# Patient Record
Sex: FEMALE | Race: WHITE
[De-identification: names, ages, dates, MRNs, and addresses within clinical notes are randomized per-mention and may not be internally consistent; named-entity substitution may affect disease eponyms.]

---

## 2019-06-03 ENCOUNTER — HOSPITAL ENCOUNTER (EMERGENCY)
Dept: HOSPITAL 76 - ED | Age: 69
Discharge: HOME | End: 2019-06-03
Payer: MEDICARE

## 2019-06-03 VITALS — DIASTOLIC BLOOD PRESSURE: 77 MMHG | SYSTOLIC BLOOD PRESSURE: 105 MMHG

## 2019-06-03 DIAGNOSIS — N30.00: ICD-10-CM

## 2019-06-03 DIAGNOSIS — Z79.84: ICD-10-CM

## 2019-06-03 DIAGNOSIS — E11.9: ICD-10-CM

## 2019-06-03 DIAGNOSIS — I10: ICD-10-CM

## 2019-06-03 DIAGNOSIS — H66.001: Primary | ICD-10-CM

## 2019-06-03 LAB
ALBUMIN DIAFP-MCNC: 4.1 G/DL (ref 3.2–5.5)
ALBUMIN/GLOB SERPL: 1.2 {RATIO} (ref 1–2.2)
ALP SERPL-CCNC: 53 IU/L (ref 42–121)
ALT SERPL W P-5'-P-CCNC: 20 IU/L (ref 10–60)
ANION GAP SERPL CALCULATED.4IONS-SCNC: 12 MMOL/L (ref 6–13)
AST SERPL W P-5'-P-CCNC: 21 IU/L (ref 10–42)
BASOPHILS NFR BLD AUTO: 0 10^3/UL (ref 0–0.1)
BASOPHILS NFR BLD AUTO: 0.4 %
BILIRUB BLD-MCNC: 0.9 MG/DL (ref 0.2–1)
BUN SERPL-MCNC: 13 MG/DL (ref 6–20)
CALCIUM UR-MCNC: 9.2 MG/DL (ref 8.5–10.3)
CHLORIDE SERPL-SCNC: 102 MMOL/L (ref 101–111)
CLARITY UR REFRACT.AUTO: CLEAR
CO2 SERPL-SCNC: 24 MMOL/L (ref 21–32)
CREAT SERPLBLD-SCNC: 0.8 MG/DL (ref 0.4–1)
EOSINOPHIL # BLD AUTO: 0.1 10^3/UL (ref 0–0.7)
EOSINOPHIL NFR BLD AUTO: 1.3 %
ERYTHROCYTE [DISTWIDTH] IN BLOOD BY AUTOMATED COUNT: 13.8 % (ref 12–15)
GFRSERPLBLD MDRD-ARVRAT: 71 ML/MIN/{1.73_M2} (ref 89–?)
GLOBULIN SER-MCNC: 3.3 G/DL (ref 2.1–4.2)
GLUCOSE SERPL-MCNC: 128 MG/DL (ref 70–100)
GLUCOSE UR QL STRIP.AUTO: NEGATIVE MG/DL
HGB UR QL STRIP: 12.9 G/DL (ref 12–16)
KETONES UR QL STRIP.AUTO: NEGATIVE MG/DL
LIPASE SERPL-CCNC: 34 U/L (ref 22–51)
LYMPHOCYTES # SPEC AUTO: 1 10^3/UL (ref 1.5–3.5)
LYMPHOCYTES NFR BLD AUTO: 13.6 %
MCH RBC QN AUTO: 29.7 PG (ref 27–31)
MCHC RBC AUTO-ENTMCNC: 33.8 G/DL (ref 32–36)
MCV RBC AUTO: 87.9 FL (ref 81–99)
MONOCYTES # BLD AUTO: 0.6 10^3/UL (ref 0–1)
MONOCYTES NFR BLD AUTO: 8.1 %
NEUTROPHILS # BLD AUTO: 5.4 10^3/UL (ref 1.5–6.6)
NEUTROPHILS # SNV AUTO: 7.1 X10^3/UL (ref 4.8–10.8)
NEUTROPHILS NFR BLD AUTO: 76.6 %
NITRITE UR QL STRIP.AUTO: NEGATIVE
PDW BLD AUTO: 7.1 FL (ref 7.9–10.8)
PH UR STRIP.AUTO: 6.5 PH (ref 5–7.5)
PLATELET # BLD: 189 10^3/UL (ref 130–450)
PROT SPEC-MCNC: 7.4 G/DL (ref 6.7–8.2)
PROT UR STRIP.AUTO-MCNC: NEGATIVE MG/DL
RBC # UR STRIP.AUTO: NEGATIVE /UL
RBC MAR: 4.34 10^6/UL (ref 4.2–5.4)
SODIUM SERPLBLD-SCNC: 138 MMOL/L (ref 135–145)
SP GR UR STRIP.AUTO: <=1.005 (ref 1–1.03)
SQUAMOUS URNS QL MICRO: (no result)
UROBILINOGEN UR QL STRIP.AUTO: (no result) E.U./DL
UROBILINOGEN UR STRIP.AUTO-MCNC: NEGATIVE MG/DL

## 2019-06-03 PROCEDURE — 36415 COLL VENOUS BLD VENIPUNCTURE: CPT

## 2019-06-03 PROCEDURE — 85025 COMPLETE CBC W/AUTO DIFF WBC: CPT

## 2019-06-03 PROCEDURE — 83690 ASSAY OF LIPASE: CPT

## 2019-06-03 PROCEDURE — 87086 URINE CULTURE/COLONY COUNT: CPT

## 2019-06-03 PROCEDURE — 80053 COMPREHEN METABOLIC PANEL: CPT

## 2019-06-03 PROCEDURE — 81001 URINALYSIS AUTO W/SCOPE: CPT

## 2019-06-03 PROCEDURE — 96372 THER/PROPH/DIAG INJ SC/IM: CPT

## 2019-06-03 PROCEDURE — 99283 EMERGENCY DEPT VISIT LOW MDM: CPT

## 2019-06-03 PROCEDURE — 81003 URINALYSIS AUTO W/O SCOPE: CPT

## 2019-06-03 NOTE — ED PHYSICIAN DOCUMENTATION
History of Present Illness





- Stated complaint


Stated Complaint: FEVER/HA/BODY ACHES





- Chief complaint


Chief Complaint: Fever





- History obtained from


History obtained from: Patient, Family





- History of Present Illness


Timing: Today





- Additonal information


Additional information: 





Previously well 68-year-old female complains of a low-grade fever and muscle and

joint aches and pains beginning today.  She has had minimal cough and she does 

have a headache.  She felt that the headache may be a migraine when it began 

yesterday.  Today she has developed a fever and aches and pains.  She denies 

muffled hearing or ear pain she denies sore throat she denies urinary symptoms 

or difficulty breathing.





Review of Systems


Constitutional: reports: Fever, Chills, Myalgias, Fatigue, Sweats


Eyes: denies: Decreased vision


Ears: denies: Ear pain


Nose: denies: Rhinorrhea / runny nose, Congestion


Throat: denies: Sore throat


Cardiac: denies: Chest pain / pressure, Palpitations


Respiratory: reports: Cough.  denies: Dyspnea


GI: denies: Abdominal Pain, Nausea, Vomiting


: denies: Dysuria, Frequency


Skin: denies: Rash


Musculoskeletal: denies: Neck pain, Back pain, Extremity pain


Neurologic: reports: Headache.  denies: Generalized weakness, Focal weakness, N

umbness, Head injury, LOC





PD PAST MEDICAL HISTORY





- Past Medical History


Past Medical History: Yes


Cardiovascular: Hypertension, High cholesterol, Other


Respiratory: Pneumonia


Neuro: Headaches, Migraines


Endocrine/Autoimmune: Type 2 diabetes, HyPOthyroidism


GI: GERD, Hemorrhoids


GYN: None


: None


HEENT: None


Psych: Anxiety


Musculoskeletal: Osteoarthritis, Chronic back pain


Other Past Medical History: ventricular arrhythmia, diverticuli





- Past Surgical History


Past Surgical History: Yes


General: Colonoscopy


/GYN: Hysterectomy, Oophrectomy


HEENT: Cataracts, Tonsil/Adenoidectomy





- Present Medications


Home Medications: 


                                Ambulatory Orders











 Medication  Instructions  Recorded  Confirmed


 


Atenolol 25 mg PO 01/07/16 01/07/16


 


Atorvastatin [Lipitor] 1 mg 01/07/16 01/07/16


 


Gabapentin 100 mg PO BID 01/07/16 01/07/16


 


Lisinopril 5 mg PO 01/07/16 01/07/16


 


Omeprazole 10 mg PO 01/07/16 01/07/16


 


PARoxetine [Paxil] 10 mg PO DAILY 01/07/16 01/07/16


 


Rizatriptan Benzoate [Rizatriptan] 5 mg PO 01/07/16 01/07/16


 


chlorproMAZINE [Thorazine] 25 mg PO TID 01/07/16 01/07/16


 


hydroCHLOROthiazide 12.5 mg PO 01/07/16 01/07/16





[Hydrochlorothiazide]   


 


metFORMIN [Glucophage] 500 mg PO ONCE 01/07/16 01/07/16


 


oxyCODONE [Roxicodone] 5 mg PO ONCE 01/07/16 01/07/16


 


Amox/Clav 875/125 [Augmentin] 1 each PO Q12H #20 tablet 06/03/19 














- Allergies


Allergies/Adverse Reactions: 


                                    Allergies











Allergy/AdvReac Type Severity Reaction Status Date / Time


 


NSAIDS (Non-Steroidal Allergy  Unknown Verified 06/03/19 00:58





Anti-Inflamma     














- Social History


Does the pt smoke?: No


Smoking Status: Never smoker


Does the pt drink ETOH?: Yes


ETOH Use: Wine


Substance Use and Type: Marijuana





- Immunizations


Immunizations are current?: No


Immunizations: TDAP >10years/unknown, Other immun current





PD ED PE NORMAL





- Vitals


Vital signs reviewed: Yes (febrile )





- General


General: Alert and oriented X 3, No acute distress, Well developed/nourished





- HEENT


HEENT: Atraumatic, PERRL, EOMI, Pharynx benign, Dentition benign, Other (There 

is inflamation to the right TM along the umbo and the left TM is clear )





- Neck


Neck: Supple, no meningeal sign, No bony TTP





- Cardiac


Cardiac: RRR, No murmur





- Respiratory


Respiratory: No respiratory distress, Clear bilaterally





- Abdomen


Abdomen: Soft, Non tender





- Back


Back: No CVA TTP, No spinal TTP





- Derm


Derm: Normal color, Warm and dry, No rash





- Extremities


Extremities: No deformity, No edema





- Neuro


Neuro: Alert and oriented X 3, CNs 2-12 intact, No motor deficit, No sensory 

deficit, Normal speech


Eye Opening: Spontaneous


Motor: Obeys Commands


Verbal: Oriented


GCS Score: 15





- Psych


Psych: Normal mood, Normal affect





Results





- Vitals


Vitals: 


                               Vital Signs - 24 hr











  06/03/19 06/03/19





  00:52 02:03


 


Temperature 37.8 C H 37.7 C H


 


Heart Rate 69 


 


Respiratory 16 





Rate  


 


Blood Pressure 129/77 


 


O2 Saturation 96 








                                     Oxygen











O2 Source                      Room air

















- Labs


Labs: 


                                Laboratory Tests











  06/03/19 06/03/19 06/03/19





  01:43 01:43 02:00


 


WBC  7.1  


 


RBC  4.34  


 


Hgb  12.9  


 


Hct  38.1  


 


MCV  87.9  


 


MCH  29.7  


 


MCHC  33.8  


 


RDW  13.8  


 


Plt Count  189  


 


MPV  7.1 L  


 


Neut # (Auto)  5.4  


 


Lymph # (Auto)  1.0 L  


 


Mono # (Auto)  0.6  


 


Eos # (Auto)  0.1  


 


Baso # (Auto)  0.0  


 


Absolute Nucleated RBC  0.01  


 


Nucleated RBC %  0.1  


 


Sodium   138 


 


Potassium   4.2 


 


Chloride   102 


 


Carbon Dioxide   24 


 


Anion Gap   12.0 


 


BUN   13 


 


Creatinine   0.8 


 


Estimated GFR (MDRD)   71 L 


 


Glucose   128 H 


 


Calcium   9.2 


 


Total Bilirubin   0.9 


 


AST   21 


 


ALT   20 


 


Alkaline Phosphatase   53 


 


Total Protein   7.4 


 


Albumin   4.1 


 


Globulin   3.3 


 


Albumin/Globulin Ratio   1.2 


 


Lipase   34 


 


Urine Color    YELLOW


 


Urine Clarity    CLEAR


 


Urine pH    6.5


 


Ur Specific Gravity    <=1.005


 


Urine Protein    NEGATIVE


 


Urine Glucose (UA)    NEGATIVE


 


Urine Ketones    NEGATIVE


 


Urine Occult Blood    NEGATIVE


 


Urine Nitrite    NEGATIVE


 


Urine Bilirubin    NEGATIVE


 


Urine Urobilinogen    0.2 (NORMAL)


 


Ur Leukocyte Esterase    TRACE H


 


Urine RBC    None Seen


 


Urine WBC    6-10 H


 


Ur Squamous Epith Cells    RARE Squamous


 


Urine Bacteria    Rare


 


Ur Microscopic Review    INDICATED


 


Urine Culture Comments    INDICATED














Procedures





- IVC sono (time)


  ** 0120


Bedside IVC sono: IVC measures (cm) (1.4), Euvolemia (near)





PD MEDICAL DECISION MAKING





- ED course


Complexity details: reviewed results, re-evaluated patient, considered 

differential, d/w patient, d/w family


ED course: 


68-year-old female with fever muscle and joint aches and pains has right otitis 

on examination.  She is administered dexamethasone 10 mg orally and azithromycin

 500 mg orally.  Blood work and urinalysis are obtained. urinalysis is 

concerning for UTI and azithro is not a good choice for UTI. She is administered

 IM rocephin and we will place her on augmentin for both the OM and the UTI. 








Departure





- Departure


Disposition: 01 Home, Self Care


Clinical Impression: 


Otitis media


Qualifiers:


 Otitis media type: suppurative Chronicity: acute Laterality: right Recurrence: 

non-recurrent Spontaneous tympanic membrane rupture: without spontaneous rupture

 Qualified Code(s): H66.001 - Acute suppurative otitis media without spontaneous

 rupture of ear drum, right ear





Urinary tract infection


Qualifiers:


 Urinary tract infection type: acute cystitis Hematuria presence: without 

hematuria Qualified Code(s): N30.00 - Acute cystitis without hematuria





Condition: Stable


Instructions:  ED Otitis Media Acute Adult, ED UTI Cystitis Female


Follow-Up: 


Carolyne Farah PA-C [Primary Care Provider] - 


Prescriptions: 


Amox/Clav 875/125 [Augmentin] 1 each PO Q12H #20 tablet

## 2019-07-23 ENCOUNTER — HOSPITAL ENCOUNTER (OUTPATIENT)
Dept: HOSPITAL 76 - LAB.S | Age: 69
Discharge: HOME | End: 2019-07-23
Attending: PHYSICIAN ASSISTANT
Payer: MEDICARE

## 2019-07-23 DIAGNOSIS — E11.9: Primary | ICD-10-CM

## 2019-07-23 LAB
CHOLEST SERPL-MCNC: 146 MG/DL
CREAT UR-SCNC: 42.8 MG/DL
EST. AVERAGE GLUCOSE BLD GHB EST-MCNC: 146 MG/DL (ref 70–100)
FERRITIN SERPL-MCNC: 20.3 NG/ML (ref 11–306.8)
HB2 TOTAL: 14.2 G/DL
HBA1C BLD-MCNC: 0.7 G/DL
HDLC SERPL-MCNC: 53 MG/DL
HDLC SERPL: 2.8 {RATIO} (ref ?–4.4)
HEMOGLOBIN A1C %: 6.7 % (ref 4.6–6.2)
LDLC SERPL CALC-MCNC: 64 MG/DL
LDLC/HDLC SERPL: 1.2 {RATIO} (ref ?–4.4)
MICROALBUMIN UR-MCNC: 0.2 MG/DL (ref 0–300)
MICROALBUMIN/CREAT RATIO PNL UR: 4.7 UG/MG (ref ?–30)
VIT B12 SERPL-MCNC: 1046 PG/ML (ref 180–914)
VLDLC SERPL-SCNC: 29 MG/DL

## 2019-07-23 PROCEDURE — 82043 UR ALBUMIN QUANTITATIVE: CPT

## 2019-07-23 PROCEDURE — 80061 LIPID PANEL: CPT

## 2019-07-23 PROCEDURE — 82570 ASSAY OF URINE CREATININE: CPT

## 2019-07-23 PROCEDURE — 83721 ASSAY OF BLOOD LIPOPROTEIN: CPT

## 2019-07-23 PROCEDURE — 83036 HEMOGLOBIN GLYCOSYLATED A1C: CPT

## 2019-07-23 PROCEDURE — 36415 COLL VENOUS BLD VENIPUNCTURE: CPT

## 2019-07-23 PROCEDURE — 82607 VITAMIN B-12: CPT

## 2019-07-23 PROCEDURE — 82728 ASSAY OF FERRITIN: CPT
